# Patient Record
Sex: MALE | Race: WHITE
[De-identification: names, ages, dates, MRNs, and addresses within clinical notes are randomized per-mention and may not be internally consistent; named-entity substitution may affect disease eponyms.]

---

## 2019-07-20 ENCOUNTER — HOSPITAL ENCOUNTER (EMERGENCY)
Dept: HOSPITAL 25 - ED | Age: 69
Discharge: HOME | End: 2019-07-20
Payer: MEDICARE

## 2019-07-20 VITALS — DIASTOLIC BLOOD PRESSURE: 89 MMHG | SYSTOLIC BLOOD PRESSURE: 138 MMHG

## 2019-07-20 DIAGNOSIS — K59.00: Primary | ICD-10-CM

## 2019-07-20 DIAGNOSIS — I10: ICD-10-CM

## 2019-07-20 DIAGNOSIS — N21.1: ICD-10-CM

## 2019-07-20 DIAGNOSIS — N13.39: ICD-10-CM

## 2019-07-20 LAB
ALBUMIN SERPL BCG-MCNC: 4.3 G/DL (ref 3.2–5.2)
ALBUMIN/GLOB SERPL: 1.4 {RATIO} (ref 1–3)
ALP SERPL-CCNC: 44 U/L (ref 34–104)
ALT SERPL W P-5'-P-CCNC: 15 U/L (ref 7–52)
AMYLASE SERPL-CCNC: 33 U/L (ref 29–103)
ANION GAP SERPL CALC-SCNC: 8 MMOL/L (ref 2–11)
AST SERPL-CCNC: 20 U/L (ref 13–39)
BASOPHILS # BLD AUTO: 0 10^3/UL (ref 0–0.2)
BUN SERPL-MCNC: 22 MG/DL (ref 6–24)
BUN/CREAT SERPL: 18.3 (ref 8–20)
CALCIUM SERPL-MCNC: 9.3 MG/DL (ref 8.6–10.3)
CHLORIDE SERPL-SCNC: 110 MMOL/L (ref 101–111)
EOSINOPHIL # BLD AUTO: 0.1 10^3/UL (ref 0–0.6)
GLOBULIN SER CALC-MCNC: 3 G/DL (ref 2–4)
GLUCOSE SERPL-MCNC: 125 MG/DL (ref 70–100)
HCO3 SERPL-SCNC: 24 MMOL/L (ref 22–32)
HCT VFR BLD AUTO: 44 % (ref 42–52)
HGB BLD-MCNC: 15.1 G/DL (ref 14–18)
LYMPHOCYTES # BLD AUTO: 1.3 10^3/UL (ref 1–4.8)
MCH RBC QN AUTO: 30 PG (ref 27–31)
MCHC RBC AUTO-ENTMCNC: 34 G/DL (ref 31–36)
MCV RBC AUTO: 87 FL (ref 80–94)
MONOCYTES # BLD AUTO: 0.5 10^3/UL (ref 0–0.8)
NEUTROPHILS # BLD AUTO: 8.2 10^3/UL (ref 1.5–7.7)
NRBC # BLD AUTO: 0 10^3/UL
NRBC BLD QL AUTO: 0
PLATELET # BLD AUTO: 96 10^3/UL (ref 150–450)
POTASSIUM SERPL-SCNC: 4 MMOL/L (ref 3.5–5)
PROT SERPL-MCNC: 7.3 G/DL (ref 6.4–8.9)
RBC # BLD AUTO: 5.13 10^6 /UL (ref 4.18–5.48)
RBC UR QL AUTO: (no result)
SODIUM SERPL-SCNC: 142 MMOL/L (ref 135–145)
WBC # BLD AUTO: 10.2 10^3/UL (ref 3.5–10.8)
WBC UR QL AUTO: (no result)

## 2019-07-20 PROCEDURE — 81003 URINALYSIS AUTO W/O SCOPE: CPT

## 2019-07-20 PROCEDURE — 83605 ASSAY OF LACTIC ACID: CPT

## 2019-07-20 PROCEDURE — 87040 BLOOD CULTURE FOR BACTERIA: CPT

## 2019-07-20 PROCEDURE — 83690 ASSAY OF LIPASE: CPT

## 2019-07-20 PROCEDURE — 82150 ASSAY OF AMYLASE: CPT

## 2019-07-20 PROCEDURE — 86140 C-REACTIVE PROTEIN: CPT

## 2019-07-20 PROCEDURE — 85025 COMPLETE CBC W/AUTO DIFF WBC: CPT

## 2019-07-20 PROCEDURE — 87491 CHLMYD TRACH DNA AMP PROBE: CPT

## 2019-07-20 PROCEDURE — 96374 THER/PROPH/DIAG INJ IV PUSH: CPT

## 2019-07-20 PROCEDURE — 96375 TX/PRO/DX INJ NEW DRUG ADDON: CPT

## 2019-07-20 PROCEDURE — 74176 CT ABD & PELVIS W/O CONTRAST: CPT

## 2019-07-20 PROCEDURE — 96361 HYDRATE IV INFUSION ADD-ON: CPT

## 2019-07-20 PROCEDURE — 87591 N.GONORRHOEAE DNA AMP PROB: CPT

## 2019-07-20 PROCEDURE — 80053 COMPREHEN METABOLIC PANEL: CPT

## 2019-07-20 PROCEDURE — 99284 EMERGENCY DEPT VISIT MOD MDM: CPT

## 2019-07-20 PROCEDURE — 82550 ASSAY OF CK (CPK): CPT

## 2019-07-20 PROCEDURE — 85060 BLOOD SMEAR INTERPRETATION: CPT

## 2019-07-20 PROCEDURE — 81015 MICROSCOPIC EXAM OF URINE: CPT

## 2019-07-20 PROCEDURE — 74019 RADEX ABDOMEN 2 VIEWS: CPT

## 2019-07-20 PROCEDURE — 87086 URINE CULTURE/COLONY COUNT: CPT

## 2019-07-20 PROCEDURE — 36415 COLL VENOUS BLD VENIPUNCTURE: CPT

## 2019-07-20 NOTE — ED
Abdominal Pain/Male





- HPI Summary


HPI Summary: 





70 yo male presents with diarrhea. He tells me that he has had had a bowel 

movement for 2 days, which is unusual for him. Last night he had one large BM 

and some lower abdominal cramping. He went to bed. This morning he felt fine, 

had coffee with his breakfast, and then went to [employer] festival with his 

wife. While at the festival he began to have generalized abdominal cramping 

again and had a large BM. He came to the ED because abdominal cramping 

continued. Currently he feels improved after having another large BM in the ED, 

but the abdominal cramping returns before having another a BM. He denies fever, 

chills, headache, dizziness, SOB, chest pain, nausea, vomiting, dysuria. He 

denies drug or alcohol use today.





His BP is very high today. He tells me that he recently discovered this at his 

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 He 

tells me that his PCP placed him on a holter monitor to detect for underlying 

conditions and he is scheduled to follow up with PCP nayeli the next 2 weeks. 

He was not placed on any medication for HTN. He does not smoke and admits to 

drinking very rarely.





- History of Current Complaint


Chief Complaint: EDAbdPain


Stated Complaint: ABD PAIN PER EMS


Time Seen by Provider: 07/20/19 17:35


Hx Obtained From: Patient


Onset/Duration: Sudden Onset


Timing: Constant


Severity Initially: Severe


Severity Currently: Severe


Pain Intensity: 8


Pain Scale Used: 0-10 Numeric





- Allergies/Home Medications


Allergies/Adverse Reactions: 


 Allergies











Allergy/AdvReac Type Severity Reaction Status Date / Time


 


environmental sx Allergy  Runny Nose Uncoded 07/17/14 14:38














PMH/Surg Hx/FS Hx/Imm Hx


Endocrine/Hematology History: 


   Denies: Hx Anticoagulant Therapy, Hx Diabetes


Cardiovascular History: 


   Denies: Hx Angina, Hx Cardiac Arrest, Hx Congestive Heart Failure, Hx 

Coronary Artery Disease, Hx Hypotension, Hx Hypertension


Respiratory History: 


   Denies: Hx Asthma


Neurological History: 


   Denies: Hx CVA, Hx Headaches, Hx Migraine


Psychiatric History: 


   Denies: Hx Anxiety, Hx Depression





- Surgical History


Surgical History: Yes


Surgery Procedure, Year, and Place: right knee arthroscopy 2014





- Immunization History


Immunizations Up to Date: Yes


Infectious Disease History: No


Infectious Disease History: 


   Denies: Hx Hepatitis, Hx Shingles, Traveled Outside the [place] in Last 30 Days





- Family History


Known Family History: Positive: Hypertension





- Social History


Lives: With Family


Alcohol Use: Occasionally


Substance Use Type: Reports: None


Smoking Status (MU): Never Smoked Tobacco





Review of Systems


Constitutional: Negative


Eyes: Negative


ENT: Negative


Cardiovascular: Negative


Respiratory: Negative


Positive: Abdominal Pain, Diarrhea


Genitourinary: Negative


Musculoskeletal: Negative


Skin: Negative


Neurological: Negative


Psychological: Normal


All Other Systems Reviewed And Are Negative: Yes





Physical Exam





- Summary


Physical Exam Summary: 





GENERAL: NAD. WDWN. No pain distress.


SKIN: No rashes, sores, or open wounds.


HEENT:


            Head: AT/NC


            Eyes: PERRLA. EOM intact. 


NECK: Supple. Nontender. No lymphadenopathy. 


CHEST:  CTAB. No r/r/w. No accessory muscle use. Breathing comfortably and in 

no distress.


CV:  RRR. Without m/r/g. Pulses intact. Brisk cap refill. No JVD. 


ABDOMEN:  Soft. NTTP. No distention or guarding. No organomegaly. No CVA 

tenderness. Bowel sounds normoactive. No pusitile mass. 


NEURO: Alert. 


PSYCH: Age appropriate behavior.





Triage Information Reviewed: Yes


Vital Signs On Initial Exam: 


 Initial Vitals











Temp Pulse Resp BP Pulse Ox


 


 97.5 F   64   16   219/103   96 


 


 07/20/19 16:15  07/20/19 16:15  07/20/19 16:15  07/20/19 16:15  07/20/19 16:15











Vital Signs Reviewed: Yes





Diagnostics





- Vital Signs


 Vital Signs (72 hours)











  07/20/19 07/20/19 07/20/19





  16:15 16:27 16:29


 


Temperature 97.5 F  


 


Pulse Rate 64  


 


Respiratory 16 17 20





Rate   


 


Blood Pressure 219/103 219/103 





(mmHg)   


 


O2 Sat by Pulse 96  





Oximetry   














  07/20/19 07/20/19 07/20/19





  16:51 16:57 17:00


 


Temperature   


 


Pulse Rate   


 


Respiratory 20 19 17





Rate   


 


Blood Pressure 213/93 214/106 





(mmHg)   


 


O2 Sat by Pulse   





Oximetry   














  07/20/19 07/20/19 07/20/19





  17:11 17:38 17:57


 


Temperature   


 


Pulse Rate   


 


Respiratory 23 13 23





Rate   


 


Blood Pressure 217/104 204/93 206/108





(mmHg)   


 


O2 Sat by Pulse   





Oximetry   














  07/20/19 07/20/19 07/20/19





  18:00 18:52 18:57


 


Temperature   


 


Pulse Rate   


 


Respiratory 17 31 15





Rate   


 


Blood Pressure  198/94 195/102





(mmHg)   


 


O2 Sat by Pulse   





Oximetry   














  07/20/19 07/20/19 07/20/19





  19:00 19:27 19:57


 


Temperature   


 


Pulse Rate   


 


Respiratory 22 20 19





Rate   


 


Blood Pressure  191/97 214/106





(mmHg)   


 


O2 Sat by Pulse   





Oximetry   














  07/20/19 07/20/19 07/20/19





  20:00 20:27 20:57


 


Temperature   


 


Pulse Rate   


 


Respiratory 15 17 22





Rate   


 


Blood Pressure  213/97 161/88





(mmHg)   


 


O2 Sat by Pulse   





Oximetry   














  07/20/19 07/20/19 07/20/19





  21:00 21:27 21:57


 


Temperature   


 


Pulse Rate   


 


Respiratory 20 20 16





Rate   


 


Blood Pressure  142/77 148/82





(mmHg)   


 


O2 Sat by Pulse   





Oximetry   














  07/20/19 07/20/19





  22:01 22:30


 


Temperature  97.9 F


 


Pulse Rate  74


 


Respiratory 25 16





Rate  


 


Blood Pressure  138/89





(mmHg)  


 


O2 Sat by Pulse  99





Oximetry  














- Laboratory


Lab Results: 





 Laboratory Tests











  07/20/19 07/20/19 07/20/19





  17:40 17:49 17:49


 


WBC   10.2 


 


RBC   5.13 


 


Hgb   15.1 


 


Hct   44 


 


MCV   87 


 


MCH   30 


 


MCHC   34 


 


RDW   14 


 


Plt Count   96 L 


 


MPV   9.4 


 


Neut % (Auto)   80.9 


 


Lymph % (Auto)   13.0 


 


Mono % (Auto)   4.6 


 


Eos % (Auto)   1.4 


 


Baso % (Auto)   0.1 


 


Absolute Neuts (auto)   8.2 H 


 


Absolute Lymphs (auto)   1.3 


 


Absolute Monos (auto)   0.5 


 


Absolute Eos (auto)   0.1 


 


Absolute Basos (auto)   0.0 


 


Absolute Nucleated RBC   0.0 


 


Nucleated RBC %   0.0 


 


Sodium    142


 


Potassium    4.0


 


Chloride    110


 


Carbon Dioxide    24


 


Anion Gap    8


 


BUN    22


 


Creatinine    1.20 H


 


Est GFR ( Amer)    72.2


 


Est GFR (Non-Af Amer)    59.7


 


BUN/Creatinine Ratio    18.3


 


Glucose    125 H


 


Lactic Acid   


 


Calcium    9.3


 


Total Bilirubin    0.80


 


AST    20


 


ALT    15


 


Alkaline Phosphatase    44


 


Total Creatine Kinase   


 


C-Reactive Protein    3.32


 


Total Protein    7.3


 


Albumin    4.3


 


Globulin    3.0


 


Albumin/Globulin Ratio    1.4


 


Amylase    33


 


Lipase    < 10 L


 


Urine Color  Yellow  


 


Urine Appearance  Cloudy  


 


Urine pH  5.0  


 


Ur Specific Gravity  1.025  


 


Urine Protein  1+(30 mg/dl) A  


 


Urine Ketones  1+ A  


 


Urine Blood  2+ A  


 


Urine Nitrate  Negative  


 


Urine Bilirubin  Negative  


 


Urine Urobilinogen  Negative  


 


Ur Leukocyte Esterase  2+ A  


 


Urine WBC (Auto)  2+(11-20/hpf) A  


 


Urine RBC (Auto)  3+(>10/hpf) A  


 


Ur Squamous Epith Cells  Present A  


 


Urine Bacteria  Absent  


 


Urine Glucose  Negative  














  07/20/19 07/20/19





  17:49 17:49


 


WBC  


 


RBC  


 


Hgb  


 


Hct  


 


MCV  


 


MCH  


 


MCHC  


 


RDW  


 


Plt Count  


 


MPV  


 


Neut % (Auto)  


 


Lymph % (Auto)  


 


Mono % (Auto)  


 


Eos % (Auto)  


 


Baso % (Auto)  


 


Absolute Neuts (auto)  


 


Absolute Lymphs (auto)  


 


Absolute Monos (auto)  


 


Absolute Eos (auto)  


 


Absolute Basos (auto)  


 


Absolute Nucleated RBC  


 


Nucleated RBC %  


 


Sodium  


 


Potassium  


 


Chloride  


 


Carbon Dioxide  


 


Anion Gap  


 


BUN  


 


Creatinine  


 


Est GFR ( Amer)  


 


Est GFR (Non-Af Amer)  


 


BUN/Creatinine Ratio  


 


Glucose  


 


Lactic Acid  0.9 


 


Calcium  


 


Total Bilirubin  


 


AST  


 


ALT  


 


Alkaline Phosphatase  


 


Total Creatine Kinase   261 H


 


C-Reactive Protein  


 


Total Protein  


 


Albumin  


 


Globulin  


 


Albumin/Globulin Ratio  


 


Amylase  


 


Lipase  


 


Urine Color  


 


Urine Appearance  


 


Urine pH  


 


Ur Specific Gravity  


 


Urine Protein  


 


Urine Ketones  


 


Urine Blood  


 


Urine Nitrate  


 


Urine Bilirubin  


 


Urine Urobilinogen  


 


Ur Leukocyte Esterase  


 


Urine WBC (Auto)  


 


Urine RBC (Auto)  


 


Ur Squamous Epith Cells  


 


Urine Bacteria  


 


Urine Glucose  











Result Diagrams: 


 07/20/19 17:49





 07/20/19 17:49


Lab Statement: Any lab studies that have been ordered have been reviewed, and 

results considered in the medical decision making process.





- Radiology


  ** Abdomen


Radiology Interpretation Completed By: Radiologist


Summary of Radiographic Findings: IMPRESSION:  Moderate colonic fecal load. 

Clinical correlation with constipation.





- CT


  ** abd pelv with po


CT Interpretation Completed By: Radiologist


Summary of CT Findings: IMPRESSION:  Right hydronephrosis and hydroureter with 

perinephric stranding. No obstructing.  calculus.  Calculus in the bladder 

lumen measuring 3 mm. Findings represent recent passage.  of calculus in the 

bladder.





Re-Evaluation





- Re-Evaluation


  ** First Eval


Re-Evaluation Time: 17:30


Change: Worse


Comment: Pt now nauseous and continues to have large formed BMs. No change in 

pain.





  ** Second Eval


Re-Evaluation Time: 20:00


Comment: Pt now with right flank pain and states his pain could be a kidney 

stone as he has had these in the past. Will change CT with contrast to ONLY po 

contrast.  BP still elevated 2 hours s/p clonidine - discussed with Dr. Castrejon 

and will give him hydralazine 10mg IV.





  ** Third Eval


Re-Evaluation Time: 21:32


Change: Improved


Comment: Improved pain s/p toradol. BP improved s/p hyralazine. Discussed CT 

results





Abdominal Pain Male Course/Dx





- Course


Course Of Treatment: In the ED, he had a large BM that was well formed and had 

no visible blood. He rates his abdominal pain 8-10/10 - is noted to be sitting 

comfortably in a chair texting and laughing with me and female friend with him.

  Regarding his HTN, he was given Clonidine 0.1mg po with little change in his 

BP after 2 hours. Discussed with Dr. Castrejon and he was given hydralazine 10mg 

IV.  During the ED course he began to have less and less abdominal discomfort, 

but began to have right flank pain and nausea. States that he has had kidney 

stones in the past and this feels the same. He was given toradol for his 

discomfort with complete resolution of his pain and zofran for his nausea which 

improved. CT with po contrast was ordered to eval for urethral stone vs 

diverticulitis/inflammatory bowel. CT positive for calculi as above. UA with 

leuks, but this was a dirty catch as pt refused to use the cleansing wipe. 

Discussed results with pt. BP significantly improved to 138/89 upon dc. Will rx 

for tramadol and flomax. He has an appointment with his PCP scheduled for 

Wednesday - advised to keep this for f/u.





- Diagnoses


Provider Diagnoses: 


 Urethral stone, Constipation, Hypertension








Discharge





- Sign-Out/Discharge


Documenting (check all that apply): Patient Departure


Patient Received Moderate/Deep Sedation with Procedure: No





- Discharge Plan


Condition: Stable


Disposition: HOME


Prescriptions: 


Tamsulosin CAP* [Flomax CAP*] 0.4 mg PO BEDTIME #7 cap


traMADol TAB* [Ultram*] 50 mg PO Q12H PRN #6 tab MDD 2


 PRN Reason: Pain


Patient Education Materials:  Kidney Stones (ED), Hypertension (ED)


Referrals: 


No Primary Care Phys,NOPCP [Primary Care Provider] - 


Gary,Galileo, MD [Medical Doctor] - As Soon As Possible


Additional Instructions: 


If you develop a fever, shortness of breath, chest pain, new or worsening 

symptoms - please call your PCP or go to the ED immediately.


 





Your blood pressure was high at todays visit. Please see your primary provider 

on wednesday as scheduled for a recheck.





Please take the TRAMADOL as directed if needed for pain





Take the flomax daily at bedtime for 1 week or until you pass your kidney stone.





Drink plenty of clear fluids to stay hydrated





- Billing Disposition and Condition


Condition: STABLE


Disposition: Home